# Patient Record
Sex: FEMALE | Race: WHITE | NOT HISPANIC OR LATINO | Employment: PART TIME | URBAN - METROPOLITAN AREA
[De-identification: names, ages, dates, MRNs, and addresses within clinical notes are randomized per-mention and may not be internally consistent; named-entity substitution may affect disease eponyms.]

---

## 2018-02-16 LAB — HBA1C MFR BLD HPLC: 5 %

## 2018-06-04 ENCOUNTER — TELEPHONE (OUTPATIENT)
Dept: UROLOGY | Facility: AMBULATORY SURGERY CENTER | Age: 18
End: 2018-06-04

## 2018-06-04 NOTE — TELEPHONE ENCOUNTER
Reason for appointment/Complaint/Diagnosis : FREQUENT UTI'S - DR Rell Smith 89: [facility]     History of Cancer? NO                If yes, what kind? Previous urologist?   NO               Records requested/where? DR NEVAREZ OVER URINE TESTS    Outside testing/where? NO

## 2018-06-14 ENCOUNTER — OFFICE VISIT (OUTPATIENT)
Dept: UROLOGY | Facility: CLINIC | Age: 18
End: 2018-06-14
Payer: COMMERCIAL

## 2018-06-14 VITALS
SYSTOLIC BLOOD PRESSURE: 130 MMHG | HEART RATE: 98 BPM | BODY MASS INDEX: 26.52 KG/M2 | HEIGHT: 66 IN | WEIGHT: 165 LBS | DIASTOLIC BLOOD PRESSURE: 80 MMHG

## 2018-06-14 DIAGNOSIS — N39.0 RECURRENT UTI: Primary | ICD-10-CM

## 2018-06-14 LAB
BACTERIA UR QL AUTO: ABNORMAL /HPF
BILIRUB UR QL STRIP: NEGATIVE
CLARITY UR: CLEAR
COLOR UR: ABNORMAL
GLUCOSE UR STRIP-MCNC: NEGATIVE MG/DL
HGB UR QL STRIP.AUTO: NEGATIVE
HYALINE CASTS #/AREA URNS LPF: ABNORMAL /LPF
KETONES UR STRIP-MCNC: NEGATIVE MG/DL
LEUKOCYTE ESTERASE UR QL STRIP: NEGATIVE
NITRITE UR QL STRIP: NEGATIVE
NON-SQ EPI CELLS URNS QL MICRO: ABNORMAL /HPF
PH UR STRIP.AUTO: 7 [PH] (ref 4.5–8)
PROT UR STRIP-MCNC: ABNORMAL MG/DL
RBC #/AREA URNS AUTO: ABNORMAL /HPF
SL AMB  POCT GLUCOSE, UA: NORMAL
SL AMB LEUKOCYTE ESTERASE,UA: NORMAL
SL AMB POCT BILIRUBIN,UA: NORMAL
SL AMB POCT BLOOD,UA: NORMAL
SL AMB POCT CLARITY,UA: CLEAR
SL AMB POCT COLOR,UA: YELLOW
SL AMB POCT KETONES,UA: NORMAL
SL AMB POCT NITRITE,UA: NORMAL
SL AMB POCT PH,UA: 7.5
SL AMB POCT SPECIFIC GRAVITY,UA: 1.02
SL AMB POCT URINE PROTEIN: NORMAL
SL AMB POCT UROBILINOGEN: NORMAL
SP GR UR STRIP.AUTO: 1.03 (ref 1–1.03)
UROBILINOGEN UR QL STRIP.AUTO: 1 E.U./DL
WBC #/AREA URNS AUTO: ABNORMAL /HPF

## 2018-06-14 PROCEDURE — 81002 URINALYSIS NONAUTO W/O SCOPE: CPT | Performed by: PHYSICIAN ASSISTANT

## 2018-06-14 PROCEDURE — 99204 OFFICE O/P NEW MOD 45 MIN: CPT | Performed by: PHYSICIAN ASSISTANT

## 2018-06-14 PROCEDURE — 87086 URINE CULTURE/COLONY COUNT: CPT | Performed by: PHYSICIAN ASSISTANT

## 2018-06-14 PROCEDURE — 81001 URINALYSIS AUTO W/SCOPE: CPT | Performed by: PHYSICIAN ASSISTANT

## 2018-06-14 PROCEDURE — 87147 CULTURE TYPE IMMUNOLOGIC: CPT | Performed by: PHYSICIAN ASSISTANT

## 2018-06-14 RX ORDER — NORETHINDRONE ACETATE/ETHINYL ESTRADIOL 1.5-0.03MG
KIT ORAL
Refills: 16 | COMMUNITY
Start: 2018-06-03

## 2018-06-14 RX ORDER — MELATONIN
1000 DAILY
COMMUNITY

## 2018-06-14 NOTE — PROGRESS NOTES
6/14/2018      Chief Complaint   Patient presents with    Urinary Tract Infection     Recurrent        Assessment and Plan    19 y o  female    1  Recurrent UTI  -discussed with the patient that we will obtain a urine culture for data and to see if she has persistent bacteruria  -encourage proper hydration was 60 oz of water a day  -continue cranberry and probiotics  -if symptoms return she is to notify our office and a culture will be obtained at that time, if bacterial growth will start prophylactic antibiotics following intercourse, discussed with the patient that although she does not believe her UTIs are associated with intercourse she can have benefit and improvement in her UTIs with this course of treatment     2  Gross hematuria  -discussed with the patient I would like to obtain an ultrasound and perform cystoscopy, the patient defers cystoscopy at this time but will obtain an ultrasound, will follow up in 1 month to review this      History of Present Illness  Romualdo Gilford is a 19 y o  female here for initial evaluation of recurrent UTIs  Multiple cultures x 3 showing e coli UTIs between April and May  Patient reports two prior infections started in February  Did have gross hematuria during these UTIs  Has neverseen blood in her urine other than during UTI  Does not believe her urinary tract infections are associated with intercourse  States that she was referred to us because she has persistent bacterial growth on cultures but is asymptomatic  States that they offered her further antibiotics at her family doctor but she defers because they do not make her feel well  Denies any prior urologic history or issues other than her UTIs  Is not a smoker and denies any family history of genitourinary cancers  Review of Systems   Constitutional: Negative for activity change, chills and fever  Gastrointestinal: Negative for abdominal distention and abdominal pain     Musculoskeletal: Negative for back pain and gait problem  Psychiatric/Behavioral: Negative for behavioral problems and confusion  Urinary Incontinence Screening      Most Recent Value   Urinary Incontinence   Urinary Incontinence? No   Incomplete emptying? No   Urinary frequency? No   Urinary urgency? No   Urinary hesitancy? No   Dysuria (painful difficult urination)? No   Nocturia (waking up to use the bathroom)? No   Straining (having to push to go)? No   Weak stream?  No   Intermittent stream?  No   Post void dribbling? No          Past Medical History  History reviewed  No pertinent past medical history  Past Social History  History reviewed  No pertinent surgical history  History   Smoking Status    Not on file   Smokeless Tobacco    Not on file       Past Family History  History reviewed  No pertinent family history  Past Social history  Social History     Social History    Marital status: Single     Spouse name: N/A    Number of children: N/A    Years of education: N/A     Occupational History    Not on file  Social History Main Topics    Smoking status: Not on file    Smokeless tobacco: Not on file    Alcohol use Not on file    Drug use: Unknown    Sexual activity: Not on file     Other Topics Concern    Not on file     Social History Narrative    No narrative on file       Current Medications  Current Outpatient Prescriptions   Medication Sig Dispense Refill    cholecalciferol (VITAMIN D3) 1,000 units tablet Take 1,000 Units by mouth daily      Cranberry 1000 MG CAPS Take by mouth      KYLER 1 5/30 1 5-30 MG-MCG TABS TAKE 1 ACTIVE TABLET BY MOUTH CONTINUOUSLY  16     No current facility-administered medications for this visit          Allergies  Allergies   Allergen Reactions    Bactrim [Sulfamethoxazole-Trimethoprim] Rash         The following portions of the patient's history were reviewed and updated as appropriate: allergies, current medications, past medical history, past social history, past surgical history and problem list       Vitals  Vitals:    06/14/18 1111   BP: 130/80   Pulse: 98   Weight: 74 8 kg (165 lb)   Height: 5' 6" (1 676 m)           Physical Exam  Constitutional   General appearance: Patient is seated and in no acute distress, well appearing and well nourished  Head and Face   Head and face: Normal     Eyes   Conjunctiva and lids: No erythema, swelling or discharge  Ears, Nose, Mouth, and Throat   Hearing: Normal     Pulmonary   Respiratory effort: No increased work of breathing or signs of respiratory distress  Cardiovascular   Examination of extremities for edema and/or varicosities: Normal     Abdomen   Abdomen: Non-tender, no masses  Musculoskeletal   Gait and station: Normal     Skin   Skin and subcutaneous tissue: Warm, dry, and intact  No visible lesions or rashes  Psychiatric   Judgment and insight: Normal  Recent and remote memory:  Normal  Mood and affect: Normal      Results  Recent Results (from the past 1 hour(s))   POCT urine dip    Collection Time: 06/14/18 11:20 AM   Result Value Ref Range    LEUKOCYTE ESTERASE,UA -      NITRITE,UA -     SL AMB POCT UROBILINOGEN -     SL AMB POCT URINE PROTEIN +++      PH,UA 7 5      BLOOD,UA -      SPECIFIC GRAVITY,UA 1 020      KETONES,UA -      BILIRUBIN,UA -     GLUCOSE, UA -      COLOR,UA yellow      CLARITY,UA clear    ]  No results found for: PSA  No results found for: GLUCOSE, CALCIUM, NA, K, CO2, CL, BUN, CREATININE  No results found for: WBC, HGB, HCT, MCV, PLT      Orders  Orders Placed This Encounter   Procedures    US kidney and bladder     Standing Status:   Future     Standing Expiration Date:   6/14/2022     Scheduling Instructions:      "Prep required if being scheduled in conjunction with other studies, refer to those examination's Preps first before scheduling  All patients for US Kidney and Bladder they must drink 24 oz of water 60 minutes before your scheduled appointment time   This test requires you to have a FULL bladder  Please do not urinate before your test             Please bring your physician order, insurance cards, a form of photo ID and a list of your medications with you  Arrive 15 minutes prior to your appointment time in order to register  If you need to have lab work or a urinalysis, please do this AFTER your ultrasound  "            To schedule this appointment, please contact Central Scheduling at [phone]  Order Specific Question:   Reason for Exam:     Answer:   calculi     Order Specific Question:   Is the patient pregnant?      Answer:   Unknown    POCT urine dip

## 2018-06-15 LAB
BACTERIA UR CULT: ABNORMAL
BACTERIA UR CULT: ABNORMAL

## 2018-06-18 ENCOUNTER — TELEPHONE (OUTPATIENT)
Dept: UROLOGY | Facility: CLINIC | Age: 18
End: 2018-06-18

## 2018-06-18 NOTE — TELEPHONE ENCOUNTER
----- Message from Maxime Clements PA-C sent at 6/18/2018  7:43 AM EDT -----  Can notify the patient she has very small amounts of bacteria that can cause a UTI (gram negative sukumar) as well as vaginal lisa (group B hemolytic strep) on her culture and that these results do not warrant treatment

## 2018-08-17 ENCOUNTER — HOSPITAL ENCOUNTER (OUTPATIENT)
Dept: ULTRASOUND IMAGING | Facility: MEDICAL CENTER | Age: 18
Discharge: HOME | End: 2018-08-17
Payer: COMMERCIAL

## 2018-08-17 DIAGNOSIS — N39.0 RECURRENT UTI: ICD-10-CM

## 2018-08-17 PROCEDURE — 76770 US EXAM ABDO BACK WALL COMP: CPT

## 2018-08-22 ENCOUNTER — OFFICE VISIT (OUTPATIENT)
Dept: UROLOGY | Facility: CLINIC | Age: 18
End: 2018-08-22
Payer: COMMERCIAL

## 2018-08-22 VITALS
HEIGHT: 66 IN | SYSTOLIC BLOOD PRESSURE: 120 MMHG | DIASTOLIC BLOOD PRESSURE: 80 MMHG | BODY MASS INDEX: 26.68 KG/M2 | HEART RATE: 92 BPM | WEIGHT: 166 LBS

## 2018-08-22 DIAGNOSIS — N39.0 RECURRENT UTI: Primary | ICD-10-CM

## 2018-08-22 PROCEDURE — 99213 OFFICE O/P EST LOW 20 MIN: CPT | Performed by: UROLOGY

## 2018-08-22 RX ORDER — DIPHENOXYLATE HYDROCHLORIDE AND ATROPINE SULFATE 2.5; .025 MG/1; MG/1
1 TABLET ORAL DAILY
COMMUNITY

## 2018-08-22 NOTE — PROGRESS NOTES
UROLOGY ROUTINE FOLLOW UP NOTE     CHIEF COMPLAINT   Patito Neumann is a 19 y o  female with a complaint of   Chief Complaint   Patient presents with    Urinary Tract Infection     Recurrent       History of Present Illness:     57-year-old female with a history of urinary tract infection in April through June  Patient had E coli by an outside urine culture and this did demonstrate resistance pattern  The infection was specifically resistant to Bactrim which the patient was placed on  She did have an allergic reaction to the Bactrim medication  Patient was started on the 2nd antibiotic of which he cannot remember the name  She did symptomatic Cammie improved over the subsequent 2 months  She has not had any issues since  She is using cranberry juice and probiotics  She has no issues with constipation  She does think this may have been related to sexual intercourse but has not had significant issues with urinary tract infections in the past     Past Medical History:   History reviewed  No pertinent past medical history  PAST SURGICAL HISTORY:   History reviewed  No pertinent surgical history  CURRENT MEDICATIONS:     Current Outpatient Prescriptions   Medication Sig Dispense Refill    KYLER 1 5/30 1 5-30 MG-MCG TABS TAKE 1 ACTIVE TABLET BY MOUTH CONTINUOUSLY  16    multivitamin (THERAGRAN) TABS Take 1 tablet by mouth daily      Probiotic Product (PROBIOTIC-10) CAPS Take 1,000 mcg by mouth      cholecalciferol (VITAMIN D3) 1,000 units tablet Take 1,000 Units by mouth daily      Cranberry 1000 MG CAPS Take by mouth       No current facility-administered medications for this visit          ALLERGIES:     Allergies   Allergen Reactions    Bactrim [Sulfamethoxazole-Trimethoprim] Rash       SOCIAL HISTORY:     Social History     Social History    Marital status: Single     Spouse name: N/A    Number of children: N/A    Years of education: N/A     Social History Main Topics    Smoking status: None    Smokeless tobacco: None    Alcohol use None    Drug use: Unknown    Sexual activity: Not Asked     Other Topics Concern    None     Social History Narrative    None       SOCIAL HISTORY:   History reviewed  No pertinent family history  REVIEW OF SYSTEMS:     Review of Systems   Constitutional: Negative  Respiratory: Negative  Cardiovascular: Negative  Gastrointestinal: Negative  Genitourinary: Negative  Musculoskeletal: Negative  Neurological: Negative  Psychiatric/Behavioral: Negative  PHYSICAL EXAM:     /80   Pulse 92   Ht 5' 6" (1 676 m)   Wt 75 3 kg (166 lb)   BMI 26 79 kg/m²     General:  Healthy appearing female in no acute distress  They have a normal affect  There is not appear to be any gross neurologic defects or abnormalities  HEENT:  Normocephalic, atraumatic  Neck is supple without any palpable lymphadenopathy  Cardiovascular:  Patient has normal palpable distal radial pulses  There is no significant peripheral edema  No JVD is noted  Respiratory:  Patient has unlabored respirations  There is no audible wheeze or rhonchi  Abdomen:  Abdomen is without surgical scars  Abdomen is soft and nontender  There is no tympany  Inguinal and umbilical hernia are not appreciated  Musculoskeletal:  Patient does not have significant CVA tenderness in the  flank with palpation or percussion  They full range of motion in all 4 extremities  Strength in all 4 extremities appears congruent  Patient is able to ambulate without assistance or difficulty  Dermatologic:  Patient has no skin abnormalities or rashes        LABS:     CBC: No results found for: WBC, HGB, HCT, MCV, PLT    BMP: No results found for: GLUCOSE, CALCIUM, NA, K, CO2, CL, BUN, CREATININE    UCx data available in CareEverywhere    IMAGIN/17/18  RENAL ULTRASOUND     INDICATION:   N39 0: Urinary tract infection, site not specified      COMPARISON: None     TECHNIQUE: Ultrasound of the retroperitoneum was performed with a curvilinear transducer utilizing volumetric sweeps and still imaging techniques       FINDINGS:     KIDNEYS:  Symmetric and normal size  Right kidney:  11 0 x 4 2 cm  Left kidney:  10 8 x 4 7 cm      Right kidney  Normal echogenicity and contour  No suspicious masses detected  No hydronephrosis  No shadowing calculi  No perinephric fluid collections      Left kidney  Normal echogenicity and contour  No suspicious masses detected  No hydronephrosis  No shadowing calculi  No perinephric fluid collections      URETERS:  Nonvisualized      BLADDER:   Normally distended  No focal thickening or mass lesions  Bilateral ureteral jets detected  The prevoid urinary volume measured 575 mL  The post void residual urine volume measured 15 mL      IMPRESSION:     Unremarkable ultrasound of the bilateral kidneys and urinary bladder      No significant postvoid residual urine volume  ASSESSMENT:     19 y o  female with prior resistant E coli infection    PLAN:     I reviewed with the patient the findings of her prior urine cultures in the reason that the Bactrim was likely unsuccessful in treating  In the future if the patient has infectious type symptoms, I have recommended a formal urine culture and tailoring of the antibiotics based on susceptibility  I have discussed with her ways to reduce infections in the future, good hygiene practices, good hydration, cranberry and probiotic usage, bowel control, and urination after intercourse  If the patient develops recurrent symptomatic infections after intercourse, we could discuss postcoital antibiotics per    At this point time, the patient is doing very well and I have released her to as needed follow-up  She knows to call if she develops any recurrence of symptoms    We would certainly be happy to see her back in the future if needed

## 2018-08-22 NOTE — PATIENT INSTRUCTIONS

## 2019-01-07 ENCOUNTER — APPOINTMENT (EMERGENCY)
Dept: RADIOLOGY | Facility: HOSPITAL | Age: 19
End: 2019-01-07
Payer: COMMERCIAL

## 2019-01-07 ENCOUNTER — HOSPITAL ENCOUNTER (EMERGENCY)
Facility: HOSPITAL | Age: 19
Discharge: HOME | End: 2019-01-07
Attending: EMERGENCY MEDICINE
Payer: COMMERCIAL

## 2019-01-07 VITALS
HEART RATE: 91 BPM | BODY MASS INDEX: 27.04 KG/M2 | OXYGEN SATURATION: 99 % | RESPIRATION RATE: 17 BRPM | DIASTOLIC BLOOD PRESSURE: 72 MMHG | SYSTOLIC BLOOD PRESSURE: 128 MMHG | TEMPERATURE: 98.1 F | WEIGHT: 167.55 LBS

## 2019-01-07 DIAGNOSIS — R00.2 PALPITATIONS: Primary | ICD-10-CM

## 2019-01-07 LAB
ANION GAP SERPL CALCULATED.3IONS-SCNC: 11 MMOL/L (ref 4–13)
ATRIAL RATE: 115 BPM
BACTERIA UR QL AUTO: ABNORMAL /HPF
BASOPHILS # BLD AUTO: 0.04 THOUSANDS/ΜL (ref 0–0.1)
BASOPHILS NFR BLD AUTO: 0 % (ref 0–1)
BILIRUB UR QL STRIP: NEGATIVE
BUN SERPL-MCNC: 8 MG/DL (ref 5–25)
CALCIUM SERPL-MCNC: 9.5 MG/DL (ref 8.3–10.1)
CHLORIDE SERPL-SCNC: 102 MMOL/L (ref 100–108)
CLARITY UR: CLEAR
CLARITY, POC: CLEAR
CO2 SERPL-SCNC: 26 MMOL/L (ref 21–32)
COLOR UR: YELLOW
COLOR, POC: YELLOW
CREAT SERPL-MCNC: 0.92 MG/DL (ref 0.6–1.3)
DEPRECATED D DIMER PPP: <270 NG/ML (FEU)
EOSINOPHIL # BLD AUTO: 0.1 THOUSAND/ΜL (ref 0–0.61)
EOSINOPHIL NFR BLD AUTO: 1 % (ref 0–6)
ERYTHROCYTE [DISTWIDTH] IN BLOOD BY AUTOMATED COUNT: 12.7 % (ref 11.6–15.1)
EXT PREG TEST URINE: NEGATIVE
EXT PREG TEST URINE: NEGATIVE
GFR SERPL CREATININE-BSD FRML MDRD: 90 ML/MIN/1.73SQ M
GLUCOSE SERPL-MCNC: 101 MG/DL (ref 65–140)
GLUCOSE UR STRIP-MCNC: NEGATIVE MG/DL
HCT VFR BLD AUTO: 48.3 % (ref 34.8–46.1)
HGB BLD-MCNC: 16.1 G/DL (ref 11.5–15.4)
HGB UR QL STRIP.AUTO: ABNORMAL
IMM GRANULOCYTES # BLD AUTO: 0.03 THOUSAND/UL (ref 0–0.2)
IMM GRANULOCYTES NFR BLD AUTO: 0 % (ref 0–2)
KETONES UR STRIP-MCNC: ABNORMAL MG/DL
LEUKOCYTE ESTERASE UR QL STRIP: ABNORMAL
LYMPHOCYTES # BLD AUTO: 3.82 THOUSANDS/ΜL (ref 0.6–4.47)
LYMPHOCYTES NFR BLD AUTO: 38 % (ref 14–44)
MCH RBC QN AUTO: 30.6 PG (ref 26.8–34.3)
MCHC RBC AUTO-ENTMCNC: 33.3 G/DL (ref 31.4–37.4)
MCV RBC AUTO: 92 FL (ref 82–98)
MONOCYTES # BLD AUTO: 0.65 THOUSAND/ΜL (ref 0.17–1.22)
MONOCYTES NFR BLD AUTO: 6 % (ref 4–12)
NEUTROPHILS # BLD AUTO: 5.49 THOUSANDS/ΜL (ref 1.85–7.62)
NEUTS SEG NFR BLD AUTO: 55 % (ref 43–75)
NITRITE UR QL STRIP: NEGATIVE
NON-SQ EPI CELLS URNS QL MICRO: ABNORMAL /HPF
NRBC BLD AUTO-RTO: 0 /100 WBCS
P AXIS: 75 DEGREES
PH UR STRIP.AUTO: 5 [PH] (ref 4.5–8)
PLATELET # BLD AUTO: 354 THOUSANDS/UL (ref 149–390)
PMV BLD AUTO: 10.5 FL (ref 8.9–12.7)
POTASSIUM SERPL-SCNC: 3.6 MMOL/L (ref 3.5–5.3)
PR INTERVAL: 114 MS
PROT UR STRIP-MCNC: ABNORMAL MG/DL
QRS AXIS: 93 DEGREES
QRSD INTERVAL: 86 MS
QT INTERVAL: 304 MS
QTC INTERVAL: 420 MS
RBC # BLD AUTO: 5.27 MILLION/UL (ref 3.81–5.12)
RBC #/AREA URNS AUTO: ABNORMAL /HPF
SODIUM SERPL-SCNC: 139 MMOL/L (ref 136–145)
SP GR UR STRIP.AUTO: 1.02 (ref 1–1.03)
T WAVE AXIS: 26 DEGREES
TROPONIN I SERPL-MCNC: <0.02 NG/ML
TSH SERPL DL<=0.05 MIU/L-ACNC: 1.03 UIU/ML (ref 0.46–3.98)
UROBILINOGEN UR QL STRIP.AUTO: 0.2 E.U./DL
VENTRICULAR RATE: 115 BPM
WBC # BLD AUTO: 10.13 THOUSAND/UL (ref 4.31–10.16)
WBC #/AREA URNS AUTO: ABNORMAL /HPF

## 2019-01-07 PROCEDURE — 85025 COMPLETE CBC W/AUTO DIFF WBC: CPT | Performed by: EMERGENCY MEDICINE

## 2019-01-07 PROCEDURE — 81001 URINALYSIS AUTO W/SCOPE: CPT

## 2019-01-07 PROCEDURE — 80048 BASIC METABOLIC PNL TOTAL CA: CPT | Performed by: EMERGENCY MEDICINE

## 2019-01-07 PROCEDURE — 85379 FIBRIN DEGRADATION QUANT: CPT | Performed by: EMERGENCY MEDICINE

## 2019-01-07 PROCEDURE — 81025 URINE PREGNANCY TEST: CPT | Performed by: EMERGENCY MEDICINE

## 2019-01-07 PROCEDURE — 99285 EMERGENCY DEPT VISIT HI MDM: CPT

## 2019-01-07 PROCEDURE — 93010 ELECTROCARDIOGRAM REPORT: CPT | Performed by: INTERNAL MEDICINE

## 2019-01-07 PROCEDURE — 36415 COLL VENOUS BLD VENIPUNCTURE: CPT | Performed by: EMERGENCY MEDICINE

## 2019-01-07 PROCEDURE — 93005 ELECTROCARDIOGRAM TRACING: CPT

## 2019-01-07 PROCEDURE — 84443 ASSAY THYROID STIM HORMONE: CPT | Performed by: EMERGENCY MEDICINE

## 2019-01-07 PROCEDURE — 71046 X-RAY EXAM CHEST 2 VIEWS: CPT

## 2019-01-07 PROCEDURE — 84484 ASSAY OF TROPONIN QUANT: CPT | Performed by: EMERGENCY MEDICINE

## 2019-01-07 PROCEDURE — 96360 HYDRATION IV INFUSION INIT: CPT

## 2019-01-07 RX ADMIN — SODIUM CHLORIDE 1000 ML: 0.9 INJECTION, SOLUTION INTRAVENOUS at 13:56

## 2019-01-07 NOTE — DISCHARGE INSTRUCTIONS
Heart Palpitations   WHAT YOU NEED TO KNOW:   Heart palpitations are feelings that your heart races, jumps, throbs, or flutters  You may feel extra beats, no beats for a short time, or skipped beats  You may have these feelings in your chest, throat, or neck  They may happen when you are sitting, standing, or lying  Heart palpitations may be frightening, but are usually not caused by a serious problem  DISCHARGE INSTRUCTIONS:   Call 911 or have someone else call for any of the following:   · You have any of the following signs of a heart attack:      ¨ Squeezing, pressure, or pain in your chest that lasts longer than 5 minutes or returns    ¨ Discomfort or pain in your back, neck, jaw, stomach, or arm     ¨ Trouble breathing    ¨ Nausea or vomiting    ¨ Lightheadedness or a sudden cold sweat, especially with chest pain or trouble breathing    · You have any of the following signs of a stroke:      ¨ Numbness or drooping on one side of your face     ¨ Weakness in an arm or leg    ¨ Confusion or difficulty speaking    ¨ Dizziness, a severe headache, or vision loss    · You faint or lose consciousness  Return to the emergency department if:   · Your palpitations happen more often or get more intense  Contact your healthcare provider if:   · You have new or worsening swelling in your feet or ankles  · You have questions or concerns about your condition or care  Follow up with your healthcare provider as directed: You may need to follow up with a cardiologist  Sergio Spencer may need tests to check for heart problems that cause palpitations  Write down your questions so you remember to ask them during your visits  Keep a record:  Write down when your palpitations start and stop, what you were doing when they started, and your symptoms  Keep track of what you ate or drank within a few hours of your palpitations  Include anything that seemed to help your symptoms, such as lying down or holding your breath   This record will help you and your healthcare provider learn what triggers your palpitations  Bring this record with you to your follow up visits  Help prevent heart palpitations:   · Manage stress and anxiety  Find ways to relax such as listening to music, meditating, or doing yoga  Exercise can also help decrease stress and anxiety  Talk to someone you trust about your stress or anxiety  You can also talk to a therapist      · Get plenty of sleep every night  Ask your healthcare provider how much sleep you need each night  · Do not drink caffeine or alcohol  Caffeine and alcohol can make your palpitations worse  Caffeine is found in soda, coffee, tea, chocolate, and drinks that increase your energy  · Do not smoke  Nicotine and other chemicals in cigarettes and cigars may damage your heart and blood vessels  Ask your healthcare provider for information if you currently smoke and need help to quit  E-cigarettes or smokeless tobacco still contain nicotine  Talk to your healthcare provider before you use these products  · Do not use illegal drugs  Talk to your healthcare provider if you use illegal drugs and want help to quit  © 2017 [facility] Information is for End User's use only and may not be sold, redistributed or otherwise used for commercial purposes  All illustrations and images included in CareNotes® are the copyrighted property of [employer]  or [employer]  The above information is an  only  It is not intended as medical advice for individual conditions or treatments  Talk to your doctor, nurse or pharmacist before following any medical regimen to see if it is safe and effective for you

## 2019-01-07 NOTE — ED PROVIDER NOTES
History  Chief Complaint   Patient presents with    Chest Pain     pt reports since yesterday started with chest tightness and lightheadedness  pt states "when I take a deep breath I get a sharp pain in my chest" pt also reports palpitation  pt denies SOB, or fever  pt currently on birth control     A 78-year-old female with no significant past medical history; presents from her PCP's office for evaluation of palpitations and chest tightness, associated with chest pain on deep inspiration  Patient states symptoms began yesterday afternoon around 3 pm while she was watching her brother's basketball game  Patient states at that time she had generalized malaise, lightheadedness and nauseousness  Pt denies associated shortness of breath  Patient states since then the symptoms have persisted, which prompted evaluation by her PCP  Patient states having one similar episode approximately one week ago, however reports that symptoms lasted for a brief period of time before resolving  Patient was not evaluated at that time  Patient otherwise denies recent fever, chills, shortness of breath, abdominal pain, vomiting, diarrhea, dysuria, peripheral edema, leg pain and rashes  Patient does take birth control  No contributory family medical history  A/P:  Palpitations, associated with chest tightness, pleuritic chest pain, malaise and lightheadedness  Patient currently resting comfortably however does have a heart rate in the 110's  EKG consistent with sinus tachycardia  Will check lab work for thyroid function, anemia, electrolyte abnormality & renal impairment  Will obtain dimer to rule out pulmonary embolism  Will also obtain troponin to rule out possibility of myocarditis versus pericarditis  Will check urine for infection and pregnancy          History provided by:  Patient, parent and medical records  Chest Pain   Associated symptoms: fatigue and palpitations        Prior to Admission Medications Prescriptions Last Dose Informant Patient Reported? Taking? Cranberry 1000 MG CAPS Not Taking at Unknown time Self Yes No   Sig: Take by mouth   KYLER 1 5/30 1 5-30 MG-MCG TABS   Yes Yes   Sig: TAKE 1 ACTIVE TABLET BY MOUTH CONTINUOUSLY   Probiotic Product (PROBIOTIC-10) CAPS Not Taking at Unknown time  Yes No   Sig: Take 1,000 mcg by mouth   cholecalciferol (VITAMIN D3) 1,000 units tablet Not Taking at Unknown time Self Yes No   Sig: Take 1,000 Units by mouth daily   multivitamin (THERAGRAN) TABS Not Taking at Unknown time Self Yes No   Sig: Take 1 tablet by mouth daily      Facility-Administered Medications: None       History reviewed  No pertinent past medical history  Past Surgical History:   Procedure Laterality Date    FOOT SURGERY Bilateral     WISDOM TOOTH EXTRACTION         History reviewed  No pertinent family history  I have reviewed and agree with the history as documented  Social History   Substance Use Topics    Smoking status: Never Smoker    Smokeless tobacco: Never Used    Alcohol use No        Review of Systems   Constitutional: Positive for fatigue  Respiratory: Positive for chest tightness  Cardiovascular: Positive for chest pain and palpitations  Neurological: Positive for light-headedness  All other systems reviewed and are negative        Physical Exam  Physical Exam   General Appearance: alert and oriented, nad, non toxic appearing  Skin:  Warm, dry, intact  HEENT: atraumatic, normocephalic  Neck: Supple, trachea midline  Cardiac: RRR, tachycardia; no murmurs, rub, gallops  Pulmonary: lungs CTAB; no wheezes, rales, rhonchi  Gastrointestinal: abdomen soft, nontender, nondistended; no guarding or rebound tenderness; good bowel sounds, no mass or bruits  Extremities:  no pedal edema, 2+ pulses; no calf tenderness, no clubbing, no cyanosis  Neuro:  no focal motor or sensory deficits, CN 2-12 grossly intact  Psych:  Normal mood and affect, normal judgement and insight      Vital Signs  ED Triage Vitals [01/07/19 1330]   Temperature Pulse Respirations Blood Pressure SpO2   98 1 °F (36 7 °C) (!) 117 17 128/72 97 %      Temp Source Heart Rate Source Patient Position - Orthostatic VS BP Location FiO2 (%)   Oral Monitor Sitting Right arm --      Pain Score       No Pain           Vitals:    01/07/19 1330 01/07/19 1518   BP: 128/72    Pulse: (!) 117 91   Patient Position - Orthostatic VS: Sitting        Visual Acuity      ED Medications  Medications   sodium chloride 0 9 % bolus 1,000 mL (0 mL Intravenous Stopped 1/7/19 1445)       Diagnostic Studies  Results Reviewed     Procedure Component Value Units Date/Time    POCT urinalysis dipstick [[id number]]  (Normal) Resulted:  01/07/19 1354    Lab Status:  Final result Specimen:  Urine Updated:  01/07/19 1442     Color, UA yellow     Clarity, UA clear    Basic metabolic panel [[id number]] Collected:  01/07/19 1345    Lab Status:  Final result Specimen:  Blood from Arm, Left Updated:  01/07/19 1428     Sodium 139 mmol/L      Potassium 3 6 mmol/L      Chloride 102 mmol/L      CO2 26 mmol/L      ANION GAP 11 mmol/L      BUN 8 mg/dL      Creatinine 0 92 mg/dL      Glucose 101 mg/dL      Calcium 9 5 mg/dL      eGFR 90 ml/min/1 73sq m     Narrative:         National Kidney Disease Education Program recommendations are as follows:  GFR calculation is accurate only with a steady state creatinine  Chronic Kidney disease less than 60 ml/min/1 73 sq  meters  Kidney failure less than 15 ml/min/1 73 sq  meters  TSH [[id number]]  (Normal) Collected:  01/07/19 1345    Lab Status:  Final result Specimen:  Blood from Arm, Left Updated:  01/07/19 1428     TSH 3RD GENERATON 1 033 uIU/mL     Narrative:         Patients undergoing fluorescein dye angiography may retain small amounts of fluorescein in the body for 48-72 hours post procedure  Samples containing fluorescein can produce falsely depressed TSH values   If the patient had this procedure,a specimen should be resubmitted post fluorescein clearance            The recommended reference ranges for TSH during pregnancy are as follows:  First trimester 0 1 to 2 5 uIU/mL  Second trimester  0 2 to 3 0 uIU/mL  Third trimester 0 3 to 3 0 uIU/m      D-Dimer [[id number]]  (Normal) Collected:  01/07/19 1345    Lab Status:  Final result Specimen:  Blood from Arm, Left Updated:  01/07/19 1425     D-Dimer, Quant <270 ng/ml (FEU)     Troponin I [[id number]]  (Normal) Collected:  01/07/19 1345    Lab Status:  Final result Specimen:  Blood from Arm, Left Updated:  01/07/19 1424     Troponin I <0 02 ng/mL     Urine Microscopic [[id number]]  (Abnormal) Collected:  01/07/19 1410    Lab Status:  Final result Specimen:  Urine from Urine, Clean Catch Updated:  01/07/19 1419     RBC, UA None Seen /hpf      WBC, UA 4-10 (A) /hpf      Epithelial Cells Occasional /hpf      Bacteria, UA None Seen /hpf     CBC and differential [[id number]]  (Abnormal) Collected:  01/07/19 1345    Lab Status:  Final result Specimen:  Blood from Arm, Left Updated:  01/07/19 1359     WBC 10 13 Thousand/uL      RBC 5 27 (H) Million/uL      Hemoglobin 16 1 (H) g/dL      Hematocrit 48 3 (H) %      MCV 92 fL      MCH 30 6 pg      MCHC 33 3 g/dL      RDW 12 7 %      MPV 10 5 fL      Platelets 245 Thousands/uL      nRBC 0 /100 WBCs      Neutrophils Relative 55 %      Immat GRANS % 0 %      Lymphocytes Relative 38 %      Monocytes Relative 6 %      Eosinophils Relative 1 %      Basophils Relative 0 %      Neutrophils Absolute 5 49 Thousands/µL      Immature Grans Absolute 0 03 Thousand/uL      Lymphocytes Absolute 3 82 Thousands/µL      Monocytes Absolute 0 65 Thousand/µL      Eosinophils Absolute 0 10 Thousand/µL      Basophils Absolute 0 04 Thousands/µL     ED Urine Macroscopic [[id number]]  (Abnormal) Collected:  01/07/19 1410    Lab Status:  Final result Specimen:  Urine Updated:  01/07/19 1354     Color, UA Yellow     Clarity, UA Clear     pH, UA 5 0 Leukocytes, UA Small (A)     Nitrite, UA Negative     Protein, UA 30 (1+) (A) mg/dl      Glucose, UA Negative mg/dl      Ketones, UA Trace (A) mg/dl      Urobilinogen, UA 0 2 E U /dl      Bilirubin, UA Negative     Blood, UA Trace (A)     Specific [place], UA 1 020    Narrative:       CLINITEK RESULT    POCT pregnancy, urine [[id number]]  (Normal) Resulted:  01/07/19 1351    Lab Status:  Final result Updated:  01/07/19 1351     EXT PREG TEST UR (Ref: Negative) Negative    POCT pregnancy, urine [[id number]]  (Normal) Resulted:  01/07/19 1351    Lab Status:  Final result Updated:  01/07/19 1351     EXT PREG TEST UR (Ref: Negative) Negative                 XR chest 2 views   ED Interpretation by Shiv Cabrera DO (01/07 1504)   No acute disease      Final Result by Danita Gerber DO (01/07 1614)      No acute cardiopulmonary disease  Workstation performed: [id number]                    Procedures  Procedures   ECG 12 Lead Documentation  Date/Time: today/date: 1/14/2019  Performed by: Matthieu Rivera    ECG reviewed by me, the ED Provider: yes    Patient location:  ED   Previous ECG:  No old for comparison   Rate:  115  ECG rate assessment: normal    Rhythm: sinus tachycardia    Ectopy:  none    QRS axis:  Normal  Intervals: normal   Q waves: None   ST segments:  Nonspecific changes  T waves: normal      Impression: Sinus tachycardia with nonspecific ST changes           Phone Contacts  ED Phone Contact    ED Course  ED Course as of Jan 14 1400   Mon Jan 07, 2019   1426 Pulmonary embolism can be ruled out D-DIMER QUANTITATIVE: <270   1518 Improving since arrival   During my conversation with the patient and her mother, HR did range from 87-low 100's  Rhythm remained regular  Labs, CXR and UA within normal limits  Pt resting comfortably at this time  Will proceed with discharge home recommending PCP follow up for potential holter monitor and cardiology evaluation  Pt and mother in agreement    Strict return precautions discussed  Pulse: 91         HEART Risk Score      Most Recent Value   History  0 Filed at: 01/09/2019 1116   ECG  1 Filed at: 01/09/2019 1116   Age  0 Filed at: 01/09/2019 1116   Risk Factors  0 Filed at: 01/09/2019 1116   Troponin  0 Filed at: 01/09/2019 1116   Heart Score Risk Calculator   History  0 Filed at: 01/09/2019 1116   ECG  1 Filed at: 01/09/2019 1116   Age  0 Filed at: 01/09/2019 1116   Risk Factors  0 Filed at: 01/09/2019 1116   Troponin  0 Filed at: 01/09/2019 1116   HEART Score  1 Filed at: 01/09/2019 1116   HEART Score  1 Filed at: 01/09/2019 1116                            MDM  CritCare Time    Disposition  Final diagnoses:   Palpitations     Time reflects when diagnosis was documented in both MDM as applicable and the Disposition within this note     Time User Action Codes Description Comment    1/7/2019  3:21 PM Bessy Phillip Add [R00 2] Palpitations       ED Disposition     ED Disposition Condition Comment    Discharge  Donne Schwab discharge to home/self care      Condition at discharge: Good        Follow-up Information     Follow up With Specialties Details Why Contact Info Additional [address],  Family Medicine Schedule an appointment as soon as possible for a visit in 1 day For re-evaluation [address] [place] [address] Emergency Department Emergency Medicine Go to If symptoms worsen AND/OR you develop chest pain or shortness of breath [address] [facility] ED, [address] [facility] [place], [place], [address]          Discharge Medication List as of 1/7/2019  3:22 PM      CONTINUE these medications which have NOT CHANGED    Details   KYLER 1 5/30 1 5-30 MG-MCG TABS TAKE 1 ACTIVE TABLET BY MOUTH CONTINUOUSLY, Historical Med      cholecalciferol (VITAMIN D3) 1,000 units tablet Take 1,000 Units by mouth daily, Historical Med      Cranberry 1000 MG CAPS Take by mouth, Historical Med      multivitamin (THERAGRAN) TABS Take 1 tablet by mouth daily, Historical Med      Probiotic Product (PROBIOTIC-10) CAPS Take 1,000 mcg by mouth, Historical Med           No discharge procedures on file      ED Provider  Electronically Signed by           Vandana Bills,   01/07/19 [address] Andrews Mejia, DO  01/14/19 1513

## 2019-01-17 ENCOUNTER — OFFICE VISIT (OUTPATIENT)
Dept: UROLOGY | Facility: CLINIC | Age: 19
End: 2019-01-17
Payer: COMMERCIAL

## 2019-01-17 VITALS
DIASTOLIC BLOOD PRESSURE: 70 MMHG | BODY MASS INDEX: 27.16 KG/M2 | WEIGHT: 169 LBS | SYSTOLIC BLOOD PRESSURE: 118 MMHG | HEIGHT: 66 IN

## 2019-01-17 DIAGNOSIS — R31.29 MICROSCOPIC HEMATURIA: ICD-10-CM

## 2019-01-17 DIAGNOSIS — N39.0 RECURRENT UTI: Primary | ICD-10-CM

## 2019-01-17 PROCEDURE — 99213 OFFICE O/P EST LOW 20 MIN: CPT | Performed by: PHYSICIAN ASSISTANT

## 2019-01-17 NOTE — PROGRESS NOTES
1/17/2019      Chief Complaint   Patient presents with    Microhematuria       Assessment and Plan    19 y o  female managed by Dr Ida Hayes    1  Microscopic hematuria  - microscopic hematuria has been intermittent and patient states during some episodes of spotting during her menstrual cycle  - will recheck urine in 6 months to trend, no further intervention warranted at this time as she has had no gross hematuria and her U/S of her kidneys and bladder was normal  - patient will call with any UTI symptoms and or gross hematuria, otherwise will notify us after she obtains urine testing       History of Present Illness  Christopher Aldridge is a 19 y o  female here for follow up evaluation of microscopic hematuria  Patient has had intermittent microscopic hematuria on urine testing  Her most recent microscopic urinalysis 1/10/2019 revealed 3-5 red blood cells  She states that she was spotting during her menstrual cycle at this time  She denies any recent urinary tract infections and/or any recurrent gross hematuria  She had an ultrasound of her kidneys and bladder during her initial presentation to us for gross hematuria and this was negative  Review of Systems   Constitutional: Negative for activity change, chills and fever  Gastrointestinal: Negative for abdominal distention and abdominal pain  Musculoskeletal: Negative for back pain and gait problem  Psychiatric/Behavioral: Negative for behavioral problems and confusion  Urinary Incontinence Screening      Most Recent Value   Urinary Incontinence   Urinary Incontinence? No   Incomplete emptying? No   Urinary frequency? No   Urinary urgency? No   Urinary hesitancy? No   Dysuria (painful difficult urination)? No   Nocturia (waking up to use the bathroom)? No   Straining (having to push to go)? No   Weak stream?  No   Intermittent stream?  No   Post void dribbling? No          Past Medical History  History reviewed   No pertinent past medical history  Past Social History  Past Surgical History:   Procedure Laterality Date    FOOT SURGERY Bilateral     WISDOM TOOTH EXTRACTION       History   Smoking Status    Never Smoker   Smokeless Tobacco    Never Used       Past Family History  History reviewed  No pertinent family history  Past Social history  Social History     Social History    Marital status: Single     Spouse name: N/A    Number of children: N/A    Years of education: N/A     Occupational History    Not on file  Social History Main Topics    Smoking status: Never Smoker    Smokeless tobacco: Never Used    Alcohol use No    Drug use: No    Sexual activity: Not on file     Other Topics Concern    Not on file     Social History Narrative    No narrative on file       Current Medications  Current Outpatient Prescriptions   Medication Sig Dispense Refill    cholecalciferol (VITAMIN D3) 1,000 units tablet Take 1,000 Units by mouth daily      KYLER 1 5/30 1 5-30 MG-MCG TABS TAKE 1 ACTIVE TABLET BY MOUTH CONTINUOUSLY  16    multivitamin (THERAGRAN) TABS Take 1 tablet by mouth daily      Cranberry 1000 MG CAPS Take by mouth      Probiotic Product (PROBIOTIC-10) CAPS Take 1,000 mcg by mouth       No current facility-administered medications for this visit  Allergies  Allergies   Allergen Reactions    Bactrim [Sulfamethoxazole-Trimethoprim] Rash         The following portions of the patient's history were reviewed and updated as appropriate: allergies, current medications, past medical history, past social history, past surgical history and problem list       Vitals  Vitals:    01/17/19 0843   BP: 118/70   Weight: 76 7 kg (169 lb)   Height: 5' 6" (1 676 m)       Physical Exam  Constitutional   General appearance: Patient is seated and in no acute distress, well appearing and well nourished  Head and Face   Head and face: Normal     Eyes   Conjunctiva and lids: No erythema, swelling or discharge      Ears, Nose, Mouth, and Throat   Hearing: Normal     Pulmonary   Respiratory effort: No increased work of breathing or signs of respiratory distress  Cardiovascular   Examination of extremities for edema and/or varicosities: Normal     Abdomen   Abdomen: Non-tender, no masses  Musculoskeletal   Gait and station: Normal    Skin   Skin and subcutaneous tissue: Warm, dry, and intact  No visible lesions or rashes  Psychiatric   Judgment and insight: Normal  Recent and remote memory:  Normal  Mood and affect: Normal      Results  No results found for this or any previous visit (from the past 1 hour(s))  ]  No results found for: PSA  Lab Results   Component Value Date    CALCIUM 9 5 01/07/2019    K 3 6 01/07/2019    CO2 26 01/07/2019     01/07/2019    BUN 8 01/07/2019    CREATININE 0 92 01/07/2019     Lab Results   Component Value Date    WBC 10 13 01/07/2019    HGB 16 1 (H) 01/07/2019    HCT 48 3 (H) 01/07/2019    MCV 92 01/07/2019     01/07/2019       Orders  Orders Placed This Encounter   Procedures    Urine culture     Standing Status:   Future     Standing Expiration Date:   1/17/2020    Urinalysis with microscopic